# Patient Record
Sex: MALE | Race: WHITE | NOT HISPANIC OR LATINO | Employment: FULL TIME | URBAN - METROPOLITAN AREA
[De-identification: names, ages, dates, MRNs, and addresses within clinical notes are randomized per-mention and may not be internally consistent; named-entity substitution may affect disease eponyms.]

---

## 2017-10-03 ENCOUNTER — APPOINTMENT (EMERGENCY)
Dept: CT IMAGING | Facility: HOSPITAL | Age: 27
End: 2017-10-03
Payer: COMMERCIAL

## 2017-10-03 ENCOUNTER — HOSPITAL ENCOUNTER (EMERGENCY)
Facility: HOSPITAL | Age: 27
Discharge: HOME | End: 2017-10-04
Attending: EMERGENCY MEDICINE
Payer: COMMERCIAL

## 2017-10-03 DIAGNOSIS — B27.90 MONONUCLEOSIS: ICD-10-CM

## 2017-10-03 DIAGNOSIS — R10.12 LUQ ABDOMINAL PAIN: Primary | ICD-10-CM

## 2017-10-03 DIAGNOSIS — E80.6 INDIRECT HYPERBILIRUBINEMIA: ICD-10-CM

## 2017-10-03 LAB
ALBUMIN SERPL BCP-MCNC: 4.8 G/DL (ref 3.5–5)
ALP SERPL-CCNC: 91 U/L (ref 46–116)
ALT SERPL W P-5'-P-CCNC: 40 U/L (ref 12–78)
ANION GAP SERPL CALCULATED.3IONS-SCNC: 15 MMOL/L (ref 4–13)
AST SERPL W P-5'-P-CCNC: 18 U/L (ref 5–45)
BASOPHILS # BLD AUTO: 0.06 THOUSANDS/ΜL (ref 0–0.1)
BASOPHILS NFR BLD AUTO: 1 % (ref 0–1)
BILIRUB SERPL-MCNC: 2.1 MG/DL (ref 0.2–1)
BUN SERPL-MCNC: 13 MG/DL (ref 5–25)
CALCIUM SERPL-MCNC: 9.5 MG/DL (ref 8.3–10.1)
CHLORIDE SERPL-SCNC: 96 MMOL/L (ref 100–108)
CO2 SERPL-SCNC: 23 MMOL/L (ref 21–32)
CREAT SERPL-MCNC: 0.93 MG/DL (ref 0.6–1.3)
EOSINOPHIL # BLD AUTO: 0.1 THOUSAND/ΜL (ref 0–0.61)
EOSINOPHIL NFR BLD AUTO: 1 % (ref 0–6)
ERYTHROCYTE [DISTWIDTH] IN BLOOD BY AUTOMATED COUNT: 12.4 % (ref 11.6–15.1)
GFR SERPL CREATININE-BSD FRML MDRD: 113 ML/MIN/1.73SQ M
GLUCOSE SERPL-MCNC: 87 MG/DL (ref 65–140)
HCT VFR BLD AUTO: 49.4 % (ref 36.5–49.3)
HGB BLD-MCNC: 17.2 G/DL (ref 12–17)
LIPASE SERPL-CCNC: 98 U/L (ref 73–393)
LYMPHOCYTES # BLD AUTO: 2.61 THOUSANDS/ΜL (ref 0.6–4.47)
LYMPHOCYTES NFR BLD AUTO: 27 % (ref 14–44)
MCH RBC QN AUTO: 28.5 PG (ref 26.8–34.3)
MCHC RBC AUTO-ENTMCNC: 34.8 G/DL (ref 31.4–37.4)
MCV RBC AUTO: 82 FL (ref 82–98)
MONOCYTES # BLD AUTO: 0.74 THOUSAND/ΜL (ref 0.17–1.22)
MONOCYTES NFR BLD AUTO: 8 % (ref 4–12)
NEUTROPHILS # BLD AUTO: 5.97 THOUSANDS/ΜL (ref 1.85–7.62)
NEUTS SEG NFR BLD AUTO: 63 % (ref 43–75)
NRBC BLD AUTO-RTO: 0 /100 WBCS
PLATELET # BLD AUTO: 258 THOUSANDS/UL (ref 149–390)
PMV BLD AUTO: 10.2 FL (ref 8.9–12.7)
POTASSIUM SERPL-SCNC: 3.1 MMOL/L (ref 3.5–5.3)
PROT SERPL-MCNC: 9.1 G/DL (ref 6.4–8.2)
RBC # BLD AUTO: 6.03 MILLION/UL (ref 3.88–5.62)
SODIUM SERPL-SCNC: 134 MMOL/L (ref 136–145)
WBC # BLD AUTO: 9.51 THOUSAND/UL (ref 4.31–10.16)

## 2017-10-03 PROCEDURE — C9113 INJ PANTOPRAZOLE SODIUM, VIA: HCPCS | Performed by: EMERGENCY MEDICINE

## 2017-10-03 PROCEDURE — 36415 COLL VENOUS BLD VENIPUNCTURE: CPT | Performed by: EMERGENCY MEDICINE

## 2017-10-03 PROCEDURE — 96374 THER/PROPH/DIAG INJ IV PUSH: CPT

## 2017-10-03 PROCEDURE — 93005 ELECTROCARDIOGRAM TRACING: CPT | Performed by: EMERGENCY MEDICINE

## 2017-10-03 PROCEDURE — 82248 BILIRUBIN DIRECT: CPT | Performed by: EMERGENCY MEDICINE

## 2017-10-03 PROCEDURE — 74177 CT ABD & PELVIS W/CONTRAST: CPT

## 2017-10-03 PROCEDURE — 96361 HYDRATE IV INFUSION ADD-ON: CPT

## 2017-10-03 PROCEDURE — 83690 ASSAY OF LIPASE: CPT | Performed by: EMERGENCY MEDICINE

## 2017-10-03 PROCEDURE — 86308 HETEROPHILE ANTIBODY SCREEN: CPT | Performed by: EMERGENCY MEDICINE

## 2017-10-03 PROCEDURE — 85025 COMPLETE CBC W/AUTO DIFF WBC: CPT | Performed by: EMERGENCY MEDICINE

## 2017-10-03 PROCEDURE — 81002 URINALYSIS NONAUTO W/O SCOPE: CPT | Performed by: EMERGENCY MEDICINE

## 2017-10-03 PROCEDURE — 80053 COMPREHEN METABOLIC PANEL: CPT | Performed by: EMERGENCY MEDICINE

## 2017-10-03 PROCEDURE — 96375 TX/PRO/DX INJ NEW DRUG ADDON: CPT

## 2017-10-03 RX ORDER — PANTOPRAZOLE SODIUM 40 MG/1
40 INJECTION, POWDER, FOR SOLUTION INTRAVENOUS ONCE
Status: COMPLETED | OUTPATIENT
Start: 2017-10-03 | End: 2017-10-03

## 2017-10-03 RX ORDER — ONDANSETRON 2 MG/ML
4 INJECTION INTRAMUSCULAR; INTRAVENOUS ONCE
Status: COMPLETED | OUTPATIENT
Start: 2017-10-03 | End: 2017-10-03

## 2017-10-03 RX ADMIN — SODIUM CHLORIDE 1000 ML: 0.9 INJECTION, SOLUTION INTRAVENOUS at 23:26

## 2017-10-03 RX ADMIN — ONDANSETRON 4 MG: 2 INJECTION INTRAMUSCULAR; INTRAVENOUS at 23:34

## 2017-10-03 RX ADMIN — PANTOPRAZOLE SODIUM 40 MG: 40 INJECTION, POWDER, FOR SOLUTION INTRAVENOUS at 23:35

## 2017-10-04 VITALS
OXYGEN SATURATION: 100 % | WEIGHT: 178 LBS | HEART RATE: 96 BPM | RESPIRATION RATE: 20 BRPM | SYSTOLIC BLOOD PRESSURE: 150 MMHG | TEMPERATURE: 98.3 F | DIASTOLIC BLOOD PRESSURE: 90 MMHG

## 2017-10-04 LAB
BILIRUB DIRECT SERPL-MCNC: 0.36 MG/DL (ref 0–0.2)
CLARITY, POC: CLEAR
COLOR, POC: YELLOW
EXT BILIRUBIN, UA: NORMAL
EXT BLOOD URINE: NORMAL
EXT GLUCOSE, UA: NORMAL
EXT KETONES: NORMAL
EXT NITRITE, UA: NORMAL
EXT PH, UA: 5
EXT PROTEIN, UA: NORMAL
EXT SPECIFIC GRAVITY, UA: 1.01
EXT UROBILINOGEN: 0.2
HETEROPH AB SER QL: NEGATIVE
WBC # BLD EST: NORMAL 10*3/UL

## 2017-10-04 PROCEDURE — 99284 EMERGENCY DEPT VISIT MOD MDM: CPT

## 2017-10-04 RX ORDER — PANTOPRAZOLE SODIUM 40 MG/1
40 TABLET, DELAYED RELEASE ORAL DAILY
Qty: 30 TABLET | Refills: 0 | Status: SHIPPED | OUTPATIENT
Start: 2017-10-04 | End: 2017-11-03

## 2017-10-04 RX ORDER — ONDANSETRON 4 MG/1
4 TABLET, ORALLY DISINTEGRATING ORAL EVERY 6 HOURS PRN
Qty: 20 TABLET | Refills: 0 | Status: SHIPPED | OUTPATIENT
Start: 2017-10-04

## 2017-10-04 RX ADMIN — IOHEXOL 100 ML: 350 INJECTION, SOLUTION INTRAVENOUS at 00:05

## 2017-10-04 NOTE — ED PROVIDER NOTES
History  Chief Complaint   Patient presents with    Abdominal Pain     Pt presents to ER with c/o's LUQ pain that radiates into his back for about 7 weeks now  Pt reports poor appetite  Incidently pt was seen at urgent care this past Saturday & dx with thrush following course of antiobiotics for sore throat  Healthy appearing adult presents in no distress  HPI    Prior to Admission Medications   Prescriptions Last Dose Informant Patient Reported? Taking?   nystatin (MYCOSTATIN) 100,000 units/mL suspension  Self Yes Yes   Sig: Apply 500,000 Units to the mouth or throat 4 (four) times a day      Facility-Administered Medications: None       History reviewed  No pertinent past medical history  No past surgical history on file  History reviewed  No pertinent family history  I have reviewed and agree with the history as documented  Social History   Substance Use Topics    Smoking status: Not on file    Smokeless tobacco: Not on file    Alcohol use Not on file        Review of Systems    Physical Exam  ED Triage Vitals [10/03/17 1936]   Temperature Pulse Respirations Blood Pressure SpO2   98 3 °F (36 8 °C) (!) 106 20 (!) 162/109 100 %      Temp Source Heart Rate Source Patient Position - Orthostatic VS BP Location FiO2 (%)   Oral Monitor Sitting Left arm --      Pain Score       --           Physical Exam   Constitutional: He is oriented to person, place, and time  He appears well-developed and well-nourished  No distress  HENT:   Head: Normocephalic and atraumatic  Mouth/Throat: Oropharynx is clear and moist and mucous membranes are normal  Tonsils are 1+ on the right  Tonsils are 1+ on the left  No tonsillar exudate  Eyes: Conjunctivae are normal  Pupils are equal, round, and reactive to light  Neck: Normal range of motion  No tracheal deviation present  Cardiovascular: Normal rate, regular rhythm, normal heart sounds and intact distal pulses      Pulmonary/Chest: Effort normal and breath sounds normal  No respiratory distress  Abdominal: Soft  Bowel sounds are normal  He exhibits no distension  There is no splenomegaly  There is no tenderness  There is no CVA tenderness  Lymphadenopathy:     He has no cervical adenopathy  Neurological: He is alert and oriented to person, place, and time  GCS eye subscore is 4  GCS verbal subscore is 5  GCS motor subscore is 6  Skin: Skin is warm and dry  Psychiatric: He has a normal mood and affect  His behavior is normal    Nursing note and vitals reviewed        ED Medications  Medications   sodium chloride 0 9 % bolus 1,000 mL (0 mL Intravenous Stopped 10/4/17 0026)   ondansetron (ZOFRAN) injection 4 mg (4 mg Intravenous Given 10/3/17 2334)   pantoprazole (PROTONIX) injection 40 mg (40 mg Intravenous Given 10/3/17 2335)   iohexol (OMNIPAQUE) 350 MG/ML injection (MULTI-DOSE) 100 mL (100 mL Intravenous Given 10/4/17 0005)       Diagnostic Studies  Labs Reviewed   CBC AND DIFFERENTIAL - Abnormal        Result Value Ref Range Status    RBC 6 03 (*) 3 88 - 5 62 Million/uL Final    Hemoglobin 17 2 (*) 12 0 - 17 0 g/dL Final    Hematocrit 49 4 (*) 36 5 - 49 3 % Final    WBC 9 51  4 31 - 10 16 Thousand/uL Final    MCV 82  82 - 98 fL Final    MCH 28 5  26 8 - 34 3 pg Final    MCHC 34 8  31 4 - 37 4 g/dL Final    RDW 12 4  11 6 - 15 1 % Final    MPV 10 2  8 9 - 12 7 fL Final    Platelets 594  429 - 390 Thousands/uL Final    nRBC 0  /100 WBCs Final    Neutrophils Relative 63  43 - 75 % Final    Lymphocytes Relative 27  14 - 44 % Final    Monocytes Relative 8  4 - 12 % Final    Eosinophils Relative 1  0 - 6 % Final    Basophils Relative 1  0 - 1 % Final    Neutrophils Absolute 5 97  1 85 - 7 62 Thousands/µL Final    Lymphocytes Absolute 2 61  0 60 - 4 47 Thousands/µL Final    Monocytes Absolute 0 74  0 17 - 1 22 Thousand/µL Final    Eosinophils Absolute 0 10  0 00 - 0 61 Thousand/µL Final    Basophils Absolute 0 06  0 00 - 0 10 Thousands/µL Final COMPREHENSIVE METABOLIC PANEL - Abnormal     Sodium 134 (*) 136 - 145 mmol/L Final    Potassium 3 1 (*) 3 5 - 5 3 mmol/L Final    Chloride 96 (*) 100 - 108 mmol/L Final    Anion Gap 15 (*) 4 - 13 mmol/L Final    Total Protein 9 1 (*) 6 4 - 8 2 g/dL Final    Total Bilirubin 2 10 (*) 0 20 - 1 00 mg/dL Final    CO2 23  21 - 32 mmol/L Final    BUN 13  5 - 25 mg/dL Final    Creatinine 0 93  0 60 - 1 30 mg/dL Final    Comment: Standardized to IDMS reference method    Glucose 87  65 - 140 mg/dL Final    Comment:   If the patient is fasting, the ADA then defines impaired fasting glucose as > 100 mg/dL and diabetes as > or equal to 123 mg/dL  Specimen collection should occur prior to Sulfasalazine administration due to the potential for falsely depressed results  Specimen collection should occur prior to Sulfapyridine administration due to the potential for falsely elevated results  Calcium 9 5  8 3 - 10 1 mg/dL Final    AST 18  5 - 45 U/L Final    Comment:   Specimen collection should occur prior to Sulfasalazine administration due to the potential for falsely depressed results  ALT 40  12 - 78 U/L Final    Comment:   Specimen collection should occur prior to Sulfasalazine administration due to the potential for falsely depressed results  Alkaline Phosphatase 91  46 - 116 U/L Final    Albumin 4 8  3 5 - 5 0 g/dL Final    eGFR 113  ml/min/1 73sq m Final    Narrative:     National Kidney Disease Education Program recommendations are as follows:  GFR calculation is accurate only with a steady state creatinine  Chronic Kidney disease less than 60 ml/min/1 73 sq  meters  Kidney failure less than 15 ml/min/1 73 sq  meters     BILIRUBIN, DIRECT - Abnormal     Bilirubin, Direct 0 36 (*) 0 00 - 0 20 mg/dL Final   LIPASE - Normal    Lipase 98  73 - 393 u/L Final   POCT URINALYSIS DIPSTICK - Normal    Color, UA yellow       Clarity, UA clear       EXT Glucose, UA neg       EXT Bilirubin, UA (Ref: Negative) small Corrected    EXT Ketones, UA (Ref: Negative) large, 160   Corrected    EXT Spec Grav, UA 1 010       EXT Blood, UA (Ref: Negative) neg       EXT pH, UA 5 0       EXT Protein, UA (Ref: Negative) trace       EXT Urobilinogen, UA (Ref: 0 2- 1 0) 0 2       EXT Leukocytes, UA (Ref: Negative) neg       EXT Nitrite, UA (Ref: Negative) neg      MONONUCLEOSIS SCREEN       CT abdomen pelvis with contrast    (Results Pending)       Procedures  ECG 12 Lead Documentation  Date/Time: 10/4/2017 12:48 AM  Performed by: Riki Li  Authorized by: Riki KNIGHT     Indications / Diagnosis:  Upper abd pain  ECG reviewed by me, the ED Provider: yes    Patient location:  ED  Previous ECG:     Previous ECG:  Unavailable  Interpretation:     Interpretation: non-specific    Rate:     ECG rate:  69    ECG rate assessment: normal    Rhythm:     Rhythm comment:  Sinus arrhythmia  Ectopy:     Ectopy: none    QRS:     QRS axis:  Normal    QRS intervals:  Normal  Conduction:     Conduction: normal    ST segments:     ST segments:  Normal  T waves:     T waves: normal            Phone Contacts  ED Phone Contact    ED Course  ED Course                                MDM  Number of Diagnoses or Management Options  Indirect hyperbilirubinemia: new and requires workup  LUQ abdominal pain: new and requires workup  Mononucleosis: new and requires workup  Diagnosis management comments: This is a 59-year-old male who presents here with multiple complaints  He states for about six weeks he has had pain in his left upper quadrant, occasionally radiating straight through to his back  In September, he developed a sore throat and temperatures up to 99  He was seen at urgent care, had a negative rapid strep, but was prescribed amoxicillin anyways  He states several days after taking this, he developed an allergic reaction, with a feeling that his neck and lower face or swelling; he denies any trouble breathing this   He was seen by his primary care doctor, and was told that he was having allergic reaction  He states he was given a shot of prednisone, switched to azithromycin, and was given several days of prednisone as an outpatient  He states his throat improved  However, he had problems with some discomfort in his mouth the developed after this, as well as stomach "upset," with intermittent cramping and intermittent pain in his left upper quadrant  He would occasionally feel nauseous after eating  He has vomited on several occasions, usually at night  He does endorse decreased appetite  He states he has diffuse stomach cramping at times  He denies any fevers, diarrhea, urinary symptoms, weight loss or weight gain, known sick contacts, other medical problems  He was seen at urgent care on a 10/30, and was diagnosed with thrush, and was told that he had thrush in his stomach that was causing his stomach upset  He has not followed up with his primary care doctor  He is tonight because he has been having pain for several weeks, and realized that he should probably be seen for it to figure out what is causing his pain  ROS: Otherwise negative, unless stated as above  He is well-appearing, in no acute distress  He has mild oral thrush with mild tonsillar enlargement without erythema or exudates  The remainder of his exam is unremarkable  There is no palpable splenomegaly  Given his course of illness in current symptoms this sounds most like mono, which would explain his initial sore throat as well as his reaction to the amoxicillin  His GI symptoms could be related to continued mono, versus side effects of the multiple medications he has been on recently  We will check lab work to assess for complications of presumed mono, as well as a CT scan to ensure he does not have massive splenomegaly  He has a mild polycythemia, mildly low electrolytes, and a mild indirect hyperbilirubinemia    The remainder of his labs are unremarkable  There are no acute abnormalities on his CT scan, specifically pathology of his liver or gallbladder  He has no splenomegaly  The patient has no known personal or family history of congenital or familial liver disorders or hemolytic syndromes  The patient has no jaundice, and denies any prior history of the same  As it is an indirect hyperbilirubinemia, he has no pain or tenderness in that area, and has no abnormal findings on his CT scan, this is less likely to be signs of an obstructive process  This could be secondary to the mono, as well as side effect of his recent antibiotic use  I do not feel that it requires any additional workup tonight  I discussed with the patient the findings, treatment at home, follow-up with his primary care doctor for further evaluation including repeat of blood work in a week to ensure that his hyperbilirubinemia is improving, and need for GI follow-up for persistent problems  I discussed indications for return, and he expresses understanding with this plan  Amount and/or Complexity of Data Reviewed  Clinical lab tests: reviewed and ordered  Tests in the radiology section of CPT®: reviewed and ordered  Independent visualization of images, tracings, or specimens: yes      CritCare Time    Disposition  Final diagnoses:   LUQ abdominal pain   Indirect hyperbilirubinemia   Mononucleosis - presumed     ED Disposition     ED Disposition Condition Comment    Discharge  Kuldeep Snow discharge to home/self care  Condition at discharge: Good        Follow-up Information     Follow up With Specialties Details Why Mirela Ritter 53, DO Family Medicine Schedule an appointment as soon as possible for a visit to follow up on your symptoms [address] [place] [address] [phone]          Patient's Medications   Discharge Prescriptions    No medications on file     No discharge procedures on file      ED Provider  Electronically Signed by       Adia Ortiz MD  10/04/17 6643

## 2017-10-04 NOTE — DISCHARGE INSTRUCTIONS
Follow up with your doctor for further evaluation of your symptoms  Take probiotics as well as the medications as prescribed  Drink plenty of fluids, and eat as you are able to tolerate  You should have your blood work for your liver rechecked in a week  Mononucleosis   WHAT YOU NEED TO KNOW:   Mononucleosis (mono) is an infection caused by a virus  Mono is spread through saliva  DISCHARGE INSTRUCTIONS:   Call 911 for any of the following:   · You have shortness of breath  · You are confused or have a seizure  Return to the emergency department if:   · You have severe pain in your abdomen or shoulder  · You have trouble swallowing because of the pain  · You urinate very little or not at all  · Your arms or legs are weak  Contact your healthcare provider if:   · Your symptoms get worse, even after treatment  · You have questions or concerns about your condition or care  Medicines:   · Acetaminophen  decreases pain and fever  It is available without a doctor's order  Ask how much to take and how often to take it  Follow directions  Acetaminophen can cause liver damage if not taken correctly  · NSAIDs , such as ibuprofen, help decrease swelling, pain, and fever  This medicine is available with or without a doctor's order  NSAIDs can cause stomach bleeding or kidney problems in certain people  If you take blood thinner medicine, always ask your healthcare provider if NSAIDs are safe for you  Always read the medicine label and follow directions  · Steroids  help decrease inflammation  · Antibiotics  may be needed if you also have a bacterial infection  · Take your medicine as directed  Contact your healthcare provider if you think your medicine is not helping or if you have side effects  Tell him of her if you are allergic to any medicine  Keep a list of the medicines, vitamins, and herbs you take  Include the amounts, and when and why you take them   Bring the list or the pill bottles to follow-up visits  Carry your medicine list with you in case of an emergency  Self-care:   · Rest as needed  Slowly start to do more each day as you feel better  · Drink liquids as directed  Liquids will help prevent dehydration  Ask how much liquid to drink each day and which liquids are best for you  · Do not play sports or exercise for 3 to 4 weeks or as directed  When you return for your follow-up visit, your healthcare provider will tell you if you are able to return to full activity  Prevent the spread of mono:  Do not share food or drinks  Do not kiss anyone  The virus may be in your saliva for several months after you feel better  Wash your hands often  Use soap and water  Wash your hands after you use the bathroom, change a child's diapers, or sneeze  Wash your hands before you prepare or eat food  Follow up with your healthcare provider in 3 to 4 weeks:  Write down your questions so you remember to ask them during your visits  © 2017 [address] [facility] Information is for End User's use only and may not be sold, redistributed or otherwise used for commercial purposes  All illustrations and images included in CareNotes® are the copyrighted property of A D A M , Inc  or [employer]  The above information is an  only  It is not intended as medical advice for individual conditions or treatments  Talk to your doctor, nurse or pharmacist before following any medical regimen to see if it is safe and effective for you  Abdominal Pain   WHAT YOU NEED TO KNOW:   Abdominal pain can be dull, achy, or sharp  You may have pain in one area of your abdomen, or in your entire abdomen  Your pain may be caused by a condition such as constipation, food sensitivity or poisoning, infection, or a blockage  Abdominal pain can also be from a hernia, appendicitis, or an ulcer  Liver, gallbladder, or kidney conditions can also cause abdominal pain   The cause of your abdominal pain may be unknown  DISCHARGE INSTRUCTIONS:   Return to the emergency department if:   · You have new chest pain or shortness of breath  · You have pulsing pain in your upper abdomen or lower back that suddenly becomes constant  · Your pain is in the right lower abdominal area and worsens with movement  · You have a fever over 100 4°F (38°C) or shaking chills  · You are vomiting and cannot keep food or liquids down  · Your pain does not improve or gets worse over the next 8 to 12 hours  · You see blood in your vomit or bowel movements, or they look black and tarry  · Your skin or the whites of your eyes turn yellow  · You are a woman and have a large amount of vaginal bleeding that is not your monthly period  Contact your healthcare provider if:   · You have pain in your lower back  · You are a man and have pain in your testicles  · You have pain when you urinate  · You have questions or concerns about your condition or care  Follow up with your healthcare provider within 24 hours or as directed:  Write down your questions so you remember to ask them during your visits  Medicines:   · Medicines  may be given to calm your stomach and prevent vomiting or to decrease pain  Ask how to take pain medicine safely  · Take your medicine as directed  Contact your healthcare provider if you think your medicine is not helping or if you have side effects  Tell him of her if you are allergic to any medicine  Keep a list of the medicines, vitamins, and herbs you take  Include the amounts, and when and why you take them  Bring the list or the pill bottles to follow-up visits  Carry your medicine list with you in case of an emergency  © 2017 [address] [employer] Information is for End User's use only and may not be sold, redistributed or otherwise used for commercial purposes   All illustrations and images included in CareNotes® are the copyrighted property of A  D A M [employer] Inc  or [employer]bel Wagner  The above information is an  only  It is not intended as medical advice for individual conditions or treatments  Talk to your doctor, nurse or pharmacist before following any medical regimen to see if it is safe and effective for you

## 2017-10-04 NOTE — ED NOTES
Patient transported to [address] [place] [address] Millie, [address] [place] [address]  10/03/17 8020

## 2017-10-05 LAB
ATRIAL RATE: 69 BPM
P AXIS: 87 DEGREES
PR INTERVAL: 128 MS
QRS AXIS: 86 DEGREES
QRSD INTERVAL: 102 MS
QT INTERVAL: 404 MS
QTC INTERVAL: 432 MS
T WAVE AXIS: 70 DEGREES
VENTRICULAR RATE: 69 BPM

## 2018-01-05 ENCOUNTER — TRANSCRIBE ORDERS (OUTPATIENT)
Dept: ADMINISTRATIVE | Facility: HOSPITAL | Age: 28
End: 2018-01-05

## 2018-01-05 DIAGNOSIS — D40.12 NEOPLASM OF UNCERTAIN BEHAVIOR OF LEFT TESTIS: Primary | ICD-10-CM

## 2018-01-06 ENCOUNTER — HOSPITAL ENCOUNTER (OUTPATIENT)
Dept: ULTRASOUND IMAGING | Facility: HOSPITAL | Age: 28
Discharge: HOME | End: 2018-01-09
Payer: COMMERCIAL

## 2018-01-06 ENCOUNTER — GENERIC CONVERSION - ENCOUNTER (OUTPATIENT)
Dept: OTHER | Facility: OTHER | Age: 28
End: 2018-01-06

## 2018-01-06 DIAGNOSIS — D40.12 NEOPLASM OF UNCERTAIN BEHAVIOR OF LEFT TESTIS: ICD-10-CM

## 2018-01-06 PROCEDURE — 76870 US EXAM SCROTUM: CPT

## 2018-02-13 ENCOUNTER — TRANSCRIBE ORDERS (OUTPATIENT)
Dept: ADMINISTRATIVE | Facility: HOSPITAL | Age: 28
End: 2018-02-13

## 2018-02-13 DIAGNOSIS — E04.9 ENLARGEMENT OF THYROID: Primary | ICD-10-CM

## 2018-02-17 ENCOUNTER — HOSPITAL ENCOUNTER (OUTPATIENT)
Dept: ULTRASOUND IMAGING | Facility: HOSPITAL | Age: 28
Discharge: HOME | End: 2018-02-17
Payer: COMMERCIAL

## 2018-02-17 DIAGNOSIS — E04.9 ENLARGEMENT OF THYROID: ICD-10-CM

## 2018-02-17 PROCEDURE — 76536 US EXAM OF HEAD AND NECK: CPT

## 2023-04-25 ENCOUNTER — HOSPITAL ENCOUNTER (OUTPATIENT)
Dept: ULTRASOUND IMAGING | Facility: CLINIC | Age: 33
Discharge: HOME | End: 2023-04-25

## 2023-04-25 DIAGNOSIS — R10.9 UNSPECIFIED ABDOMINAL PAIN: ICD-10-CM

## 2023-05-16 ENCOUNTER — HOSPITAL ENCOUNTER (OUTPATIENT)
Dept: CT IMAGING | Facility: HOSPITAL | Age: 33
Discharge: HOME | End: 2023-05-16

## 2023-05-16 DIAGNOSIS — R10.9 UNSPECIFIED ABDOMINAL PAIN: ICD-10-CM

## 2023-06-12 ENCOUNTER — PREP FOR PROCEDURE (OUTPATIENT)
Dept: SURGERY | Facility: CLINIC | Age: 33
End: 2023-06-12

## 2023-06-12 ENCOUNTER — CONSULT (OUTPATIENT)
Dept: SURGERY | Facility: CLINIC | Age: 33
End: 2023-06-12
Payer: COMMERCIAL

## 2023-06-12 VITALS
SYSTOLIC BLOOD PRESSURE: 121 MMHG | HEIGHT: 66 IN | HEART RATE: 84 BPM | WEIGHT: 175 LBS | DIASTOLIC BLOOD PRESSURE: 80 MMHG | BODY MASS INDEX: 28.12 KG/M2

## 2023-06-12 DIAGNOSIS — K40.90 INGUINAL HERNIA WITHOUT OBSTRUCTION OR GANGRENE: ICD-10-CM

## 2023-06-12 DIAGNOSIS — K42.9 UMBILICAL HERNIA WITHOUT OBSTRUCTION OR GANGRENE: Primary | ICD-10-CM

## 2023-06-12 DIAGNOSIS — K42.9 UMBILICAL HERNIA: ICD-10-CM

## 2023-06-12 DIAGNOSIS — K40.90 INGUINAL HERNIA: Primary | ICD-10-CM

## 2023-06-12 PROCEDURE — 99243 OFF/OP CNSLTJ NEW/EST LOW 30: CPT | Performed by: SURGERY

## 2023-06-12 RX ORDER — PREDNISONE 10 MG/1
TABLET ORAL
COMMUNITY
Start: 2023-05-08

## 2023-06-12 NOTE — PROGRESS NOTES
Assessment/Plan: Patient presents with a left inguinal as well as umbilical hernia  Intermittent discomfort is noted  Is been present for 4 years  It does limit his activity  Lifting and coughing irritate herniation  Operative repair is recommended  Risks and benefits explained in detail  All questions answered  There are no diagnoses linked to this encounter  Subjective:      Patient ID: Derrell Cousin is a 28 y o  male  Patient presents for possible bilateral inguinal hernia consult and umbilical hernia consult  States he has had intermittent sharp, burning pain RLQ and LLQ for 4 years  Denies bulges  Limits his activities  CT abdomen pelvis 5/16/2023:  ABDOMINAL WALL/INGUINAL REGIONS: Tiny fat-containing umbilical and small left inguinal hernias  The following portions of the patient's history were reviewed and updated as appropriate:     He  has no past medical history on file  He  has no past surgical history on file  His family history is not on file  He  has no history on file for tobacco use, alcohol use, and drug use  Current Outpatient Medications   Medication Sig Dispense Refill   • nystatin (MYCOSTATIN) 100,000 units/mL suspension Apply 500,000 Units to the mouth or throat 4 (four) times a day     • ondansetron (ZOFRAN-ODT) 4 mg disintegrating tablet Take 1 tablet by mouth every 6 (six) hours as needed for nausea or vomiting 20 tablet 0   • pantoprazole (PROTONIX) 40 mg tablet Take 1 tablet by mouth daily for 30 days 30 tablet 0   • predniSONE 10 mg tablet        No current facility-administered medications for this visit  He is allergic to amoxicillin       Review of Systems   Constitutional: Negative  Negative for activity change  HENT: Negative  Eyes: Negative  Respiratory: Negative  Cardiovascular: Negative  Gastrointestinal: Positive for abdominal pain  Endocrine: Negative  Genitourinary: Negative  Musculoskeletal: Negative      Skin: "Negative  Allergic/Immunologic: Negative  Neurological: Negative  Psychiatric/Behavioral: Negative for agitation, behavioral problems and confusion  The patient is not nervous/anxious  Objective:      /80   Pulse 84   Ht 5' 6\" (1 676 m)   Wt 79 4 kg (175 lb)   BMI 28 25 kg/m²          Physical Exam  Constitutional:       Appearance: He is well-developed  He is not diaphoretic  HENT:      Head: Normocephalic and atraumatic  Eyes:      General: No scleral icterus  Right eye: No discharge  Left eye: No discharge  Extraocular Movements: Extraocular movements intact  Conjunctiva/sclera: Conjunctivae normal    Neck:      Thyroid: No thyromegaly  Trachea: No tracheal deviation  Cardiovascular:      Rate and Rhythm: Normal rate and regular rhythm  Heart sounds: Normal heart sounds  No murmur heard  No friction rub  Pulmonary:      Effort: Pulmonary effort is normal  No respiratory distress  Breath sounds: Normal breath sounds  No stridor  No wheezing  Chest:      Chest wall: No tenderness  Abdominal:      General: There is no distension  Palpations: There is no mass  Tenderness: There is no abdominal tenderness  There is no guarding or rebound  Hernia: A hernia (, Left inguinal hernia is noted  This is reducible  Umbilical hernia is present  There is no evidence for right inguinal herniation on exam ) is present  Musculoskeletal:         General: No tenderness  Right lower leg: No edema  Left lower leg: No edema  Lymphadenopathy:      Cervical: No cervical adenopathy  Skin:     General: Skin is warm and dry  Findings: No erythema or rash  Neurological:      Mental Status: He is alert and oriented to person, place, and time  Cranial Nerves: No cranial nerve deficit        Coordination: Coordination normal    Psychiatric:         Behavior: Behavior normal          Judgment: Judgment normal          "

## 2023-07-03 NOTE — DISCHARGE INSTR - AVS FIRST PAGE
Please call the office when you leave to schedule an appointment for 2 weeks. Please call [phone]. [address], [address], [facility], [address]. Off of [address] between [facility] and [facility]. Activity:    May lift 10 lb as many times as desired the 1st week,       20 lb in 2 weeks,       30 lb in 3 weeks. Walking is encouraged  Normal daily activities including climbing steps are okay  Do not engage in strenuous activity ( sit-ups or crutches) or contact sports for 4-6 weeks post-operatively    Return to Work:   Okay to return to work when you feel well if you desire. Diet:   You may return to your normal healthy diet. Wound Care: Your wound is closed with dissolvable stitches and glue. It is okay to shower. Wash incision gently with soap and water and pat dry. Do not soak incisions in bath water or swim for two weeks. Do not apply any creams or ointments. Pain Medication:   Please take as directed if needed. May use Advil or Motrin in addition. Recall, the pain medicine and anesthesia is associated with constipation. No driving while taking narcotic pain medications. Other: It is normal to developed a “healing ridge” / firm incision after surgery. This is your body making scar tissue. It is a good sign  Constipation is very common after general anesthesia. Please use milk of magnesia as needed in order to help prevent constipation. It is normal to get bruising after surgery. If you have questions after discharge please call the office.     If you have increased pain, fever >101.5, increased drainage, redness or a bad smell at your surgery site, please call us immediately or come directly to the Emergency Room

## 2023-07-11 NOTE — PRE-PROCEDURE INSTRUCTIONS
No outpatient medications have been marked as taking for the [facility]14/[facility]). Medication instructions for day surgery reviewed. Please use only a sip of water to take your instructed medications. Avoid all over the counter vitamins, supplements and NSAIDS for one week prior to surgery per anesthesia guidelines. Tylenol is ok to take as needed. You will receive a call one business day prior to surgery with an arrival time and hospital directions. If your surgery is scheduled on a Monday, the hospital will be calling you on the Friday prior to your surgery. If you have not heard from anyone by 8pm, please call the hospital supervisor through the hospital  at [phone]. Steven Granados [phone]). Do not eat or drink anything after midnight the night before your surgery, including candy, mints, lifesavers, or chewing gum. Do not drink alcohol 24hrs before your surgery. Try not to smoke at least 24hrs before your surgery. Follow the pre surgery showering instructions as listed in the [facility] Surgical Experience Booklet” or otherwise provided by your surgeon's office. Do not shave the surgical area 24 hours before surgery. Do not apply any lotions, creams, including makeup, cologne, deodorant, or perfumes after showering on the day of your surgery. No contact lenses, eye make-up, or artificial eyelashes. Remove nail polish, including gel polish, and any artificial, gel, or acrylic nails if possible. Remove all jewelry including rings and body piercing jewelry. Wear causal clothing that is easy to take on and off. Consider your type of surgery. Keep any valuables, jewelry, piercings at home. Please bring any specially ordered equipment (sling, braces) if indicated. Arrange for a responsible person to drive you to and from the hospital on the day of your surgery. Visitor Guidelines discussed.      Call the surgeon's office with any new illnesses, exposures, or additional questions prior to surgery. Please reference your [facility] Surgical Experience Booklet” for additional information to prepare for your upcoming surgery.

## 2023-07-14 ENCOUNTER — ANESTHESIA EVENT (OUTPATIENT)
Dept: PERIOP | Facility: AMBULARY SURGERY CENTER | Age: 33
End: 2023-07-14
Payer: COMMERCIAL

## 2023-07-14 ENCOUNTER — HOSPITAL ENCOUNTER (OUTPATIENT)
Facility: AMBULARY SURGERY CENTER | Age: 33
Setting detail: OUTPATIENT SURGERY
Discharge: HOME | End: 2023-07-14
Attending: SURGERY | Admitting: SURGERY
Payer: COMMERCIAL

## 2023-07-14 ENCOUNTER — ANESTHESIA (OUTPATIENT)
Dept: PERIOP | Facility: AMBULARY SURGERY CENTER | Age: 33
End: 2023-07-14
Payer: COMMERCIAL

## 2023-07-14 VITALS
DIASTOLIC BLOOD PRESSURE: 75 MMHG | HEART RATE: 80 BPM | TEMPERATURE: 97.1 F | RESPIRATION RATE: 16 BRPM | SYSTOLIC BLOOD PRESSURE: 128 MMHG | OXYGEN SATURATION: 96 %

## 2023-07-14 DIAGNOSIS — K40.90 INGUINAL HERNIA WITHOUT OBSTRUCTION OR GANGRENE: Primary | ICD-10-CM

## 2023-07-14 PROCEDURE — 49505 PRP I/HERN INIT REDUC >5 YR: CPT | Performed by: SURGERY

## 2023-07-14 PROCEDURE — 49591 RPR AA HRN 1ST < 3 CM RDC: CPT | Performed by: SURGERY

## 2023-07-14 PROCEDURE — C1781 MESH (IMPLANTABLE): HCPCS | Performed by: SURGERY

## 2023-07-14 PROCEDURE — NC001 PR NO CHARGE: Performed by: SURGERY

## 2023-07-14 DEVICE — MODIFIED ONFLEX MESH
Type: IMPLANTABLE DEVICE | Site: GROIN | Status: FUNCTIONAL
Brand: MODIFIED ONFLEX MESH

## 2023-07-14 RX ORDER — BUPIVACAINE HYDROCHLORIDE 2.5 MG/ML
INJECTION, SOLUTION EPIDURAL; INFILTRATION; INTRACAUDAL AS NEEDED
Status: DISCONTINUED | OUTPATIENT
Start: 2023-07-14 | End: 2023-07-14 | Stop reason: HOSPADM

## 2023-07-14 RX ORDER — MAGNESIUM HYDROXIDE 1200 MG/15ML
LIQUID ORAL AS NEEDED
Status: DISCONTINUED | OUTPATIENT
Start: 2023-07-14 | End: 2023-07-14 | Stop reason: HOSPADM

## 2023-07-14 RX ORDER — HYDROMORPHONE HCL/PF 1 MG/ML
0.5 SYRINGE (ML) INJECTION
Status: DISCONTINUED | OUTPATIENT
Start: 2023-07-14 | End: 2023-07-14 | Stop reason: HOSPADM

## 2023-07-14 RX ORDER — OXYCODONE HYDROCHLORIDE AND ACETAMINOPHEN 5; 325 MG/1; MG/1
1 TABLET ORAL EVERY 4 HOURS PRN
Qty: 10 TABLET | Refills: 0 | Status: SHIPPED | OUTPATIENT
Start: 2023-07-14

## 2023-07-14 RX ORDER — CLINDAMYCIN PHOSPHATE 900 MG/50ML
900 INJECTION INTRAVENOUS EVERY 8 HOURS
Status: DISCONTINUED | OUTPATIENT
Start: 2023-07-14 | End: 2023-07-14

## 2023-07-14 RX ORDER — FENTANYL CITRATE 50 UG/ML
INJECTION, SOLUTION INTRAMUSCULAR; INTRAVENOUS AS NEEDED
Status: DISCONTINUED | OUTPATIENT
Start: 2023-07-14 | End: 2023-07-14

## 2023-07-14 RX ORDER — HYDROMORPHONE HCL/PF 1 MG/ML
0.2 SYRINGE (ML) INJECTION
Status: DISCONTINUED | OUTPATIENT
Start: 2023-07-14 | End: 2023-07-14 | Stop reason: HOSPADM

## 2023-07-14 RX ORDER — MIDAZOLAM HYDROCHLORIDE 2 MG/2ML
INJECTION, SOLUTION INTRAMUSCULAR; INTRAVENOUS AS NEEDED
Status: DISCONTINUED | OUTPATIENT
Start: 2023-07-14 | End: 2023-07-14

## 2023-07-14 RX ORDER — DEXAMETHASONE SODIUM PHOSPHATE 10 MG/ML
INJECTION, SOLUTION INTRAMUSCULAR; INTRAVENOUS AS NEEDED
Status: DISCONTINUED | OUTPATIENT
Start: 2023-07-14 | End: 2023-07-14

## 2023-07-14 RX ORDER — ONDANSETRON 2 MG/ML
4 INJECTION INTRAMUSCULAR; INTRAVENOUS EVERY 4 HOURS PRN
Status: DISCONTINUED | OUTPATIENT
Start: 2023-07-14 | End: 2023-07-14 | Stop reason: SDUPTHER

## 2023-07-14 RX ORDER — DIPHENHYDRAMINE HYDROCHLORIDE 50 MG/ML
12.5 INJECTION INTRAMUSCULAR; INTRAVENOUS ONCE AS NEEDED
Status: DISCONTINUED | OUTPATIENT
Start: 2023-07-14 | End: 2023-07-14 | Stop reason: HOSPADM

## 2023-07-14 RX ORDER — ACETAMINOPHEN 325 MG/1
650 TABLET ORAL EVERY 4 HOURS PRN
Status: DISCONTINUED | OUTPATIENT
Start: 2023-07-14 | End: 2023-07-14 | Stop reason: HOSPADM

## 2023-07-14 RX ORDER — PROPOFOL 10 MG/ML
INJECTION, EMULSION INTRAVENOUS AS NEEDED
Status: DISCONTINUED | OUTPATIENT
Start: 2023-07-14 | End: 2023-07-14

## 2023-07-14 RX ORDER — FENTANYL CITRATE/PF 50 MCG/ML
25 SYRINGE (ML) INJECTION
Status: DISCONTINUED | OUTPATIENT
Start: 2023-07-14 | End: 2023-07-14 | Stop reason: HOSPADM

## 2023-07-14 RX ORDER — OXYCODONE HYDROCHLORIDE AND ACETAMINOPHEN 5; 325 MG/1; MG/1
1 TABLET ORAL EVERY 4 HOURS PRN
Status: DISCONTINUED | OUTPATIENT
Start: 2023-07-14 | End: 2023-07-14 | Stop reason: HOSPADM

## 2023-07-14 RX ORDER — LIDOCAINE HYDROCHLORIDE 10 MG/ML
INJECTION, SOLUTION EPIDURAL; INFILTRATION; INTRACAUDAL; PERINEURAL AS NEEDED
Status: DISCONTINUED | OUTPATIENT
Start: 2023-07-14 | End: 2023-07-14

## 2023-07-14 RX ORDER — ONDANSETRON 2 MG/ML
INJECTION INTRAMUSCULAR; INTRAVENOUS AS NEEDED
Status: DISCONTINUED | OUTPATIENT
Start: 2023-07-14 | End: 2023-07-14

## 2023-07-14 RX ORDER — SODIUM CHLORIDE, SODIUM LACTATE, POTASSIUM CHLORIDE, CALCIUM CHLORIDE 600; 310; 30; 20 MG/100ML; MG/100ML; MG/100ML; MG/100ML
INJECTION, SOLUTION INTRAVENOUS CONTINUOUS PRN
Status: DISCONTINUED | OUTPATIENT
Start: 2023-07-14 | End: 2023-07-14

## 2023-07-14 RX ORDER — KETOROLAC TROMETHAMINE 30 MG/ML
INJECTION, SOLUTION INTRAMUSCULAR; INTRAVENOUS AS NEEDED
Status: DISCONTINUED | OUTPATIENT
Start: 2023-07-14 | End: 2023-07-14

## 2023-07-14 RX ORDER — CLINDAMYCIN PHOSPHATE 900 MG/50ML
900 INJECTION INTRAVENOUS ONCE
Status: COMPLETED | OUTPATIENT
Start: 2023-07-14 | End: 2023-07-14

## 2023-07-14 RX ORDER — ONDANSETRON 2 MG/ML
4 INJECTION INTRAMUSCULAR; INTRAVENOUS EVERY 4 HOURS PRN
Status: DISCONTINUED | OUTPATIENT
Start: 2023-07-14 | End: 2023-07-14 | Stop reason: HOSPADM

## 2023-07-14 RX ADMIN — OXYCODONE HYDROCHLORIDE AND ACETAMINOPHEN 1 TABLET: 5; 325 TABLET ORAL at 15:00

## 2023-07-14 RX ADMIN — ONDANSETRON 4 MG: 2 INJECTION INTRAMUSCULAR; INTRAVENOUS at 12:19

## 2023-07-14 RX ADMIN — FENTANYL CITRATE 25 MCG: 50 INJECTION, SOLUTION INTRAMUSCULAR; INTRAVENOUS at 13:56

## 2023-07-14 RX ADMIN — SODIUM CHLORIDE, SODIUM LACTATE, POTASSIUM CHLORIDE, AND CALCIUM CHLORIDE: .6; .31; .03; .02 INJECTION, SOLUTION INTRAVENOUS at 14:09

## 2023-07-14 RX ADMIN — MIDAZOLAM HYDROCHLORIDE 2 MG: 1 INJECTION, SOLUTION INTRAMUSCULAR; INTRAVENOUS at 12:19

## 2023-07-14 RX ADMIN — KETOROLAC TROMETHAMINE 30 MG: 30 INJECTION, SOLUTION INTRAMUSCULAR at 14:08

## 2023-07-14 RX ADMIN — FENTANYL CITRATE 50 MCG: 50 INJECTION, SOLUTION INTRAMUSCULAR; INTRAVENOUS at 13:22

## 2023-07-14 RX ADMIN — SODIUM CHLORIDE, SODIUM LACTATE, POTASSIUM CHLORIDE, AND CALCIUM CHLORIDE: .6; .31; .03; .02 INJECTION, SOLUTION INTRAVENOUS at 12:06

## 2023-07-14 RX ADMIN — PROPOFOL 200 MG: 10 INJECTION, EMULSION INTRAVENOUS at 12:26

## 2023-07-14 RX ADMIN — FENTANYL CITRATE 25 MCG: 50 INJECTION, SOLUTION INTRAMUSCULAR; INTRAVENOUS at 13:37

## 2023-07-14 RX ADMIN — DEXAMETHASONE SODIUM PHOSPHATE 10 MG: 10 INJECTION, SOLUTION INTRAMUSCULAR; INTRAVENOUS at 12:37

## 2023-07-14 RX ADMIN — FENTANYL CITRATE 25 MCG: 50 INJECTION, SOLUTION INTRAMUSCULAR; INTRAVENOUS at 12:34

## 2023-07-14 RX ADMIN — FENTANYL CITRATE 25 MCG: 50 INJECTION, SOLUTION INTRAMUSCULAR; INTRAVENOUS at 12:30

## 2023-07-14 RX ADMIN — CLINDAMYCIN PHOSPHATE 900 MG: 18 INJECTION, SOLUTION INTRAMUSCULAR; INTRAVENOUS at 12:18

## 2023-07-14 RX ADMIN — LIDOCAINE HYDROCHLORIDE 50 MG: 10 INJECTION, SOLUTION EPIDURAL; INFILTRATION; INTRACAUDAL; PERINEURAL at 12:26

## 2023-07-14 RX ADMIN — FENTANYL CITRATE 25 MCG: 50 INJECTION, SOLUTION INTRAMUSCULAR; INTRAVENOUS at 12:37

## 2023-07-14 RX ADMIN — FENTANYL CITRATE 25 MCG: 50 INJECTION, SOLUTION INTRAMUSCULAR; INTRAVENOUS at 12:50

## 2023-07-14 NOTE — ANESTHESIA POSTPROCEDURE EVALUATION
Post-Op Assessment Note    CV Status:  Stable  Pain Score: 0    Pain management: adequate     Mental Status:  Alert and awake   Hydration Status:  Euvolemic   PONV Controlled:  Controlled   Airway Patency:  Patent      Post Op Vitals Reviewed: Yes      Staff: CRNA         No notable events documented.     BP   140/82   Temp      Pulse  104   Resp   15   SpO2   99

## 2023-07-14 NOTE — ANESTHESIA PREPROCEDURE EVALUATION
Procedure:  REPAIR HERNIA INGUINAL (Left: Groin)  REPAIR HERNIA UMBILICAL (Abdomen)    Relevant Problems   ANESTHESIA (within normal limits)      CARDIO (within normal limits)      ENDO (within normal limits)      GI/HEPATIC (within normal limits)      /RENAL (within normal limits)      GYN (within normal limits)      HEMATOLOGY (within normal limits)      MUSCULOSKELETAL (within normal limits)      NEURO/PSYCH (within normal limits)      PULMONARY (within normal limits)      Other   (+) Inguinal hernia without obstruction or gangrene   (+) Umbilical hernia without obstruction or gangrene      Lab Results   Component Value Date    WBC 9.51 10/03/2017    HGB 17.2 (H) 10/03/2017    HCT 49.4 (H) 10/03/2017    MCV 82 10/03/2017     10/03/2017     Lab Results   Component Value Date    SODIUM 134 (L) 10/03/2017    K 3.1 (L) 10/03/2017    CL 96 (L) 10/03/2017    CO2 23 10/03/2017    AGAP 15 (H) 10/03/2017    BUN 13 10/03/2017    CREATININE 0.93 10/03/2017    GLUC 87 10/03/2017    CALCIUM 9.5 10/03/2017    AST 18 10/03/2017    ALT 40 10/03/2017    ALKPHOS 91 10/03/2017    TP 9.1 (H) 10/03/2017    TBILI 2.10 (H) 10/03/2017    EGFR 113 10/03/2017     No results found for: "PTT"  No results found for: "INR", "PROTIME"    Physical Exam    Airway    Mallampati score: II  TM Distance: >3 FB  Neck ROM: full     Dental   No notable dental hx     Cardiovascular  Rhythm: regular, Rate: normal, Cardiovascular exam normal    Pulmonary  Pulmonary exam normal Breath sounds clear to auscultation,     Other Findings        Anesthesia Plan  ASA Score- 2     Anesthesia Type- general with ASA Monitors. Additional Monitors:   Airway Plan: LMA. Plan Factors-Exercise tolerance (METS): >4 METS. Chart reviewed. EKG reviewed. Existing labs reviewed. Patient summary reviewed. Patient is not a current smoker. Patient did not smoke on day of surgery.     Obstructive sleep apnea risk education given perioperatively. Induction- intravenous. Postoperative Plan- Plan for postoperative opioid use. Informed Consent- Anesthetic plan and risks discussed with patient. I personally reviewed this patient with the CRNA. Discussed and agreed on the Anesthesia Plan with the CRNA. Vahe Greene

## 2023-07-14 NOTE — PROGRESS NOTES
-General Surgical Care   Paula Petty 28 y.o. male MRN: [id number]  Unit/Bed#: OR [place] Encounter: [id number]          ASSESSMENT: Left inguinal and umbilical hernia    PLAN: Left inguinal and umbilical hernia repair    Review of Systems  Constitutional:  Denies fever or chills   Eyes:  Denies change in visual acuity   HENT:  Denies nasal congestion or sore throat   Respiratory:  Denies cough or shortness of breath   Cardiovascular:  Denies chest pain or edema   GI:  Denies abdominal pain, nausea, vomiting, bloody stools or diarrhea   Musculoskeletal:  Denies back pain or joint pain   Integument:  Denies rash   Neurologic:  Denies headache, focal weakness or sensory changes   Endocrine:  Denies polyuria or polydipsia   Lymphatic:  Denies swollen glands   Psychiatric:  Denies depression or anxiety     Historical Information   History reviewed. No pertinent past medical history. Past Surgical History:   Procedure Laterality Date   • CLAVICLE SURGERY Left      Social History   Social History     Substance and Sexual Activity   Alcohol Use None     Social History     Substance and Sexual Activity   Drug Use Yes   • Types: Marijuana     Social History     Tobacco Use   Smoking Status Every Day   • Packs/day: 0.25   • Types: Cigarettes   • Start date: 7/11/2022   Smokeless Tobacco Not on file   Tobacco Comments    About 1 cigg a day     Family History   Problem Relation Age of Onset   • Cancer Mother        Meds/Allergies     No medications prior to admission. Current Facility-Administered Medications   Medication Dose Route Frequency   • clindamycin (CLEOCIN) IVPB (premix in dextrose) 900 mg 50 mL  900 mg Intravenous Once       Allergies   Allergen Reactions   • Amoxicillin Swelling       Objective     Blood pressure 145/90, pulse 62, temperature 97.6 °F (36.4 °C), temperature source Temporal, resp. rate 16, SpO2 100 %.     No intake or output data in the 24 hours ending 07/14/23 [address] [place] [address] appearance: alert and oriented, in no acute distress  Lungs: clear to auscultation bilaterally  Heart: regular rate and rhythm, S1, S2 normal, no murmur, click, rub or gallop  Abdomen: soft, non-tender; bowel sounds normal; no masses,  no organomegaly  Skin: Skin color, texture, turgor normal. No rashes or lesions  Left inguinal and umbilical hernias      Lab Results:   No visits with results within 1 Day(s) from this visit.    Latest known visit with results is:   Admission on 10/03/2017, Discharged on 10/04/2017   Component Date Value   • WBC 10/03/2017 9.51    • RBC 10/03/2017 6.03 (H)    • Hemoglobin 10/03/2017 17.2 (H)    • Hematocrit 10/03/2017 49.4 (H)    • MCV 10/03/2017 82    • MCH 10/03/2017 28.5    • MCHC 10/03/2017 34.8    • RDW 10/03/2017 12.4    • MPV 10/03/2017 10.2    • Platelets 15/18/1880 258    • nRBC 10/03/2017 0    • Neutrophils Relative 10/03/2017 63    • Lymphocytes Relative 10/03/2017 27    • Monocytes Relative 10/03/2017 8    • Eosinophils Relative 10/03/2017 1    • Basophils Relative 10/03/2017 1    • Neutrophils Absolute 10/03/2017 5.97    • Lymphocytes Absolute 10/03/2017 2.61    • Monocytes Absolute 10/03/2017 0.74    • Eosinophils Absolute 10/03/2017 0.10    • Basophils Absolute 10/03/2017 0.06    • Sodium 10/03/2017 134 (L)    • Potassium 10/03/2017 3.1 (L)    • Chloride 10/03/2017 96 (L)    • CO2 10/03/2017 23    • ANION GAP 10/03/2017 15 (H)    • BUN 10/03/2017 13    • Creatinine 10/03/2017 0.93    • Glucose 10/03/2017 87    • Calcium 10/03/2017 9.5    • AST 10/03/2017 18    • ALT 10/03/2017 40    • Alkaline Phosphatase 10/03/2017 91    • Total Protein 10/03/2017 9.1 (H)    • Albumin 10/03/2017 4.8    • Total Bilirubin 10/03/2017 2.10 (H)    • eGFR 10/03/2017 113    • Lipase 10/03/2017 98    • Color, UA 10/04/2017 yellow    • Clarity, UA 10/04/2017 clear    • Glucose, UA (Ref: Negati* 10/04/2017 neg    • Bilirubin, UA (Ref: Nega* 10/04/2017 small    • Ketones, UA (Ref: Negati* 10/04/2017 large, 160    • Spec Grav, UA (Ref:1.003* 10/04/2017 1.010    • Blood, UA (Ref: Negative) 10/04/2017 neg    • pH, UA (Ref: 4.5-8.0) 10/04/2017 5.0    • Protein, UA (Ref: Negati* 10/04/2017 trace    • Urobilinogen, UA (Ref: 0* 10/04/2017 0.2    •  Leukocytes, UA (Ref: Ne* 10/04/2017 neg    • Nitrite, UA (Ref: Negati* 10/04/2017 neg    • Ventricular Rate 10/03/2017 69    • Atrial Rate 10/03/2017 69    • DC Interval 10/03/2017 128    • QRSD Interval 10/03/2017 102    • QT Interval 10/03/2017 404    • QTC Interval 10/03/2017 432    • P Axis 10/03/2017 87    • QRS Axis 10/03/2017 86    • T Wave Axis 10/03/2017 70    • Monotest 10/03/2017 Negative    • Bilirubin, Direct 10/03/2017 0.36 (H)      Imaging Studies: I have personally reviewed pertinent reports. No results found. Counseling / Coordination of Care  Total time spent today  15 minutes. Greater than 50% of total time was spent with the patient and / or family counseling and / or coordination of care.

## 2023-07-14 NOTE — OP NOTE
OPERATIVE REPORT  PATIENT NAME: Yovani Pickett    :  1990  MRN: [id number]  Pt Location: [facility] ROOM 01    SURGERY DATE: 2023    Surgeon(s) and Role:     * Fan Roque MD - Primary     * Anupama Gaitan MD - Assisting    Preop Diagnosis:  Inguinal hernia [J74.73]  Umbilical hernia [W30.8]    Post-Op Diagnosis Codes:     * Inguinal hernia [G19.55]     * Umbilical hernia [T74.8]    Procedure(s):  Left - REPAIR HERNIA INGUINAL  REPAIR HERNIA UMBILICAL with mesh      Specimen(s):  * No specimens in log *    Estimated Blood Loss:   Minimal    Drains:  * No LDAs found *    Anesthesia Type:   General    Operative Indications:  Inguinal hernia [E67.28]  Umbilical hernia [O50.8]    Independent, non-smoker, ASA 2, wound class I, BMI 28, weight 175, height 66   Inguinal hernia without obstruction or gangrene   (+) Umbilical hernia without obstruction or gangrene                       Complications:   None    Procedure and Technique:  Yovani Pickett is a 28 y.o. male was brought into the operative suite and identified visually and by arm band. The patient was placed in the supine position. Careful attention was made towards padding and positioning of the extremities. After sterile prep and drape, a timeout was completed. The prep was dry. Antibiotics were provided. Athrombic pumps in place. 0.25% Marcaine with epinephrine was utilized throughout the procedure. An incision was made  within the left inguinal region. The incision was carried down through the skin and subcutaneous tissue. The superficial epigastric vein was clamped, ligated and  divided. . The external oblique fibers were identified. The external ring was identified. The external oblique fibers were then split in the direction of their fibers. Hemostats were placed on the cut edges. A self-retaining retractor was then placed. Exploration of the inguinal canal commenced.   The cremasteric fibers were then divided along the fiber direction of its fibers the cord structures. The cord was encircled in a atraumatic Penrose drain and preserved during dissection. Identification of a indirect and /      or direct inguinal hernia was performed. High dissection was completed at the level of the internal ring. Preperitoneal dissection commenced identifying and preserving the inferior epigastric vessels. A preperitoneal mesh was then inserted. The branch of the genitofemoral nerve was divided in order to help prevent postoperative neuralgia. The inguinal floor was then repaired with interrupted figure-of-eight 0 Vicryl suture. The indirect inguinal ring was repositioned more superiorly while repairing the inguinal transversalis muscular floor. An onlay mesh was then secured to the tendon overlying the lateral pubic tubercle The external oblique fascia was closed with a running 3-0 PDS suture. Additional 0.25% Marcaine with epinephrine was injected over the ilioinguinal and iliohypogastric nerves. 3-0 Vicryl subcutaneous suture was placed into the Karyn's fascia . 4-0 Monocryl suture was used for the subcuticular layer. Dermabond was then applied. The patient was then awakened from general anesthesia and transferred to the recovery room in stable condition. Sponge and instrument count correct ×2. I was present for the entire procedure. and I was present for all critical portions of the procedure.     Patient Disposition:  PACU         SIGNATURE: Nic Rdz MD  DATE: July 14, 2023  TIME: 2:17 PM PM

## 2023-08-01 ENCOUNTER — OFFICE VISIT (OUTPATIENT)
Dept: SURGERY | Facility: CLINIC | Age: 33
End: 2023-08-01

## 2023-08-01 DIAGNOSIS — K40.90 INGUINAL HERNIA WITHOUT OBSTRUCTION OR GANGRENE: Primary | ICD-10-CM

## 2023-08-01 PROCEDURE — 99024 POSTOP FOLLOW-UP VISIT: CPT | Performed by: SURGERY

## 2023-08-01 NOTE — PROGRESS NOTES
Assessment/Plan: Patient presents postoperative from repair of inguinal and umbilical hernia. He feels quite well. He has no complaints. He is advance his activities nicely. Incisions are healing well. All questions answered. There are no diagnoses linked to this encounter. Pathology: None sent      Postoperative restrictions reviewed. All questions answered. ______________________________________________________  HPI: Patient presents post operatively. Left inguinal hernia repair and umbilical hernia repair 3/03/1479. ROS:  General ROS: negative for - chills, fatigue, fever or night sweats, weight loss  Respiratory ROS: no cough, shortness of breath, or wheezing  Cardiovascular ROS: no chest pain or dyspnea on exertion  Genito-Urinary ROS: no dysuria, trouble voiding, or hematuria  Musculoskeletal ROS: negative for - gait disturbance, joint pain or muscle pain  Neurological ROS: no TIA or stroke symptoms  GI ROS: see HPI  Skin ROS: no new rashes or lesions   Lymphatic ROS: no new adenopathy noted by pt. GYN ROS: see HPI, no new GYN history or bleeding noted  Psy ROS: no new mental or behavioral disturbances         Patient Active Problem List   Diagnosis   • Inguinal hernia without obstruction or gangrene   • Umbilical hernia without obstruction or gangrene       Allergies:  Amoxicillin      Current Outpatient Medications:   •  oxyCODONE-acetaminophen (PERCOCET) 5-325 mg per tablet, Take 1 tablet by mouth every 4 (four) hours as needed for moderate pain for up to 10 doses Max Daily Amount: 6 tablets, Disp: 10 tablet, Rfl: 0    No past medical history on file.     Past Surgical History:   Procedure Laterality Date   • CLAVICLE SURGERY Left    • AK RPR 1ST INGUN HRNA AGE 5 YRS/> REDUCIBLE Left 7/14/2023    Procedure: REPAIR HERNIA INGUINAL;  Surgeon: Saadia Gagnon MD;  [facility] MA[facility];  Service: General   • AK RPR AA HERNIA 1ST < 3 CM REDUCIBLE N/A 7/14/2023    Procedure: REPAIR HERNIA UMBILICAL;  Surgeon: Alexa Berkowitz MD;  Location[facility] MAIN OR;  Service: General       Family History   Problem Relation Age of Onset   • Cancer Mother         reports that he has been smoking cigarettes. He started smoking about 12 months ago. He has been smoking an average of .25 packs per day. He does not have any smokeless tobacco history on file. He reports current drug use. Drug: Marijuana. PHYSICAL EXAM    There were no vitals taken for this visit.     General: normal, cooperative, no distress  Abdominal: soft, nondistended or nontender  Incision: clean, dry, and intact and healing well      Alexa Berkowitz MD    Date: 8/1/2023 Time: 3:20 PM

## (undated) DEVICE — SPONGE STICK WITH PVP-I: Brand: KENDALL

## (undated) DEVICE — GLOVE SRG BIOGEL ECLIPSE 7

## (undated) DEVICE — SUT VICRYL 3-0 SH 27 IN J416H

## (undated) DEVICE — DRAPE EQUIPMENT RF WAND

## (undated) DEVICE — BETHLEHEM UNIVERSAL MINOR GEN: Brand: CARDINAL HEALTH

## (undated) DEVICE — SUT VICRYL 2-0 SH 27 IN UNDYED J417H

## (undated) DEVICE — SUT VICRYL 2-0 REEL 54 IN J286G

## (undated) DEVICE — TOWEL SURG XR DETECT GREEN STRL RFD

## (undated) DEVICE — DECANTER: Brand: UNBRANDED

## (undated) DEVICE — UNDYED BRAIDED (POLYGLACTIN 910), SYNTHETIC ABSORBABLE SUTURE: Brand: COATED VICRYL

## (undated) DEVICE — PENCIL ELECTROSURG E-Z CLEAN -0035H

## (undated) DEVICE — ADHESIVE SKIN HIGH VISCOSITY EXOFIN 1ML

## (undated) DEVICE — INTENDED FOR TISSUE SEPARATION, AND OTHER PROCEDURES THAT REQUIRE A SHARP SURGICAL BLADE TO PUNCTURE OR CUT.: Brand: BARD-PARKER SAFETY BLADES SIZE 15, STERILE

## (undated) DEVICE — SUT MONOCRYL 4-0 PS-2 18 IN Y496G

## (undated) DEVICE — PAD GROUNDING ADULT

## (undated) DEVICE — SUT VICRYL 1 CT-1 27 IN J261H

## (undated) DEVICE — SUT PDS II 3-0 SH 27 IN Z316H

## (undated) DEVICE — NEEDLE 22 G X 1 1/2 SAFETY